# Patient Record
Sex: FEMALE | Race: WHITE | Employment: FULL TIME | URBAN - METROPOLITAN AREA
[De-identification: names, ages, dates, MRNs, and addresses within clinical notes are randomized per-mention and may not be internally consistent; named-entity substitution may affect disease eponyms.]

---

## 2020-07-30 ENCOUNTER — OP HISTORICAL/CONVERTED ENCOUNTER (OUTPATIENT)
Dept: OTHER | Age: 55
End: 2020-07-30

## 2020-09-03 ENCOUNTER — NURSE TRIAGE (OUTPATIENT)
Dept: OBGYN CLINIC | Age: 55
End: 2020-09-03

## 2020-09-08 DIAGNOSIS — R10.9 ABDOMINAL PAIN, UNSPECIFIED ABDOMINAL LOCATION: ICD-10-CM

## 2020-09-08 DIAGNOSIS — N83.201 RIGHT OVARIAN CYST: Primary | ICD-10-CM

## 2020-09-08 NOTE — PROGRESS NOTES
Spoke with patient and advised her per Dr Uyen Mason he would like her to have blood work drawn prior to her appt. Order entered.

## 2020-09-10 LAB — CANCER AG125 SERPL-ACNC: 9.2 U/ML (ref 0–38.1)

## 2020-09-12 VITALS — HEIGHT: 66 IN

## 2020-09-12 PROBLEM — E66.01 MORBID OBESITY (HCC): Status: ACTIVE | Noted: 2020-09-12

## 2020-09-12 PROBLEM — Z78.0 MENOPAUSE: Status: ACTIVE | Noted: 2020-09-12

## 2020-09-12 PROBLEM — N28.9 KIDNEY DISEASE: Status: ACTIVE | Noted: 2020-09-12

## 2020-09-15 ENCOUNTER — OFFICE VISIT (OUTPATIENT)
Dept: OBGYN CLINIC | Age: 55
End: 2020-09-15
Payer: COMMERCIAL

## 2020-09-15 VITALS — WEIGHT: 255.5 LBS | BODY MASS INDEX: 41.06 KG/M2 | HEIGHT: 66 IN

## 2020-09-15 DIAGNOSIS — R10.31 CHRONIC RLQ PAIN: Primary | ICD-10-CM

## 2020-09-15 DIAGNOSIS — G89.29 CHRONIC RLQ PAIN: Primary | ICD-10-CM

## 2020-09-15 DIAGNOSIS — N94.89 ADNEXAL MASS: ICD-10-CM

## 2020-09-15 PROBLEM — Z86.19 HX OF ROCKY MOUNTAIN SPOTTED FEVER: Status: ACTIVE | Noted: 2020-09-15

## 2020-09-15 PROCEDURE — 99214 OFFICE O/P EST MOD 30 MIN: CPT | Performed by: OBSTETRICS & GYNECOLOGY

## 2020-09-15 RX ORDER — LISINOPRIL 20 MG/1
20 TABLET ORAL DAILY
COMMUNITY
Start: 2019-03-12

## 2020-09-15 RX ORDER — DICYCLOMINE HYDROCHLORIDE 20 MG/1
TABLET ORAL
COMMUNITY
Start: 2020-07-17 | End: 2021-01-29 | Stop reason: ALTCHOICE

## 2020-09-15 RX ORDER — CLARITHROMYCIN 500 MG/1
500 TABLET, FILM COATED ORAL 2 TIMES DAILY
COMMUNITY
Start: 2020-08-03 | End: 2020-11-16 | Stop reason: ALTCHOICE

## 2020-09-15 RX ORDER — TRAMADOL HYDROCHLORIDE 50 MG/1
TABLET ORAL
COMMUNITY
Start: 2020-08-06

## 2020-09-15 RX ORDER — ONDANSETRON 4 MG/1
4 TABLET, FILM COATED ORAL
COMMUNITY
Start: 2020-07-15 | End: 2021-01-29 | Stop reason: ALTCHOICE

## 2020-09-15 RX ORDER — OMEPRAZOLE 20 MG/1
CAPSULE, DELAYED RELEASE ORAL
COMMUNITY
Start: 2020-08-03 | End: 2021-04-06 | Stop reason: ALTCHOICE

## 2020-09-15 RX ORDER — METHOCARBAMOL 500 MG/1
500 TABLET, FILM COATED ORAL
COMMUNITY
Start: 2020-07-13 | End: 2021-01-29 | Stop reason: ALTCHOICE

## 2020-09-15 NOTE — PROGRESS NOTES
Julian Alejandra is a , 60 y.o. female   No LMP recorded. Patient has had a hysterectomy. She presents for her problem    She is having RLQ dull constant pain in the right lower quadrant x 3 mths, no assoc. radiates to the flank lower back, no  or GI sxs- has had a CT scan, Colonoscopy and TV US, denies any F/C. Menstrual status:  Her periods are: hysterectomy. Cycles are: Hysterectomy. She does not have dysmenorrhea. Medical conditions:  Since her last annual GYN exam about two years ago, she has not the following changes in her health history: none. Past Medical History:   Diagnosis Date    Kidney disease 2020    Menopause 2020    Morbid obesity ([address] 2020     History reviewed. No pertinent surgical history. Prior to Admission medications    Medication Sig Start Date End Date Taking? Authorizing Provider   traMADoL (ULTRAM) 50 mg tablet TAKE 1 2 TABLETS BY MOUTH EVERY 6 HOURS AS NEEDED FOR PAIN 20  Yes Provider, Historical   ondansetron hcl (ZOFRAN) 4 mg tablet Take 4 mg by mouth every eight (8) hours as needed. 7/15/20  Yes Provider, Historical   omeprazole (PRILOSEC) 20 mg capsule TAKE 1 CAPSULE BY MOUTH TWICE DAILY FOR 14 DAYS THEN DAILY. 8/3/20  Yes Provider, Historical   methocarbamoL (ROBAXIN) 500 mg tablet Take 500 mg by mouth. 20  Yes Provider, Historical   lisinopriL (PRINIVIL, ZESTRIL) 20 mg tablet Take 20 mg by mouth daily. 3/12/19  Yes Provider, Historical   dicyclomine (BENTYL) 20 mg tablet TAKE 1 TABLET BY MOUTH 4 TIMES DAILY 20  Yes Provider, Historical   clarithromycin (BIAXIN) 500 mg tablet Take 500 mg by mouth two (2) times a day. 8/3/20  Yes Provider, Historical       Allergies   Allergen Reactions    Percocet [Oxycodone-Acetaminophen] Other (comments)     pruritis          Tobacco History:  reports that she has never smoked. She has never used smokeless tobacco.  Alcohol Abuse:  reports previous alcohol use.   Drug Abuse: reports no history of drug use. Family Medical/Cancer History: History reviewed. No pertinent family history. Review of Systems   Constitutional: Negative for chills, fever, malaise/fatigue and weight loss. HENT: Negative for congestion, ear pain, sinus pain and tinnitus. Eyes: Negative for blurred vision and double vision. Respiratory: Negative for cough, shortness of breath and wheezing. Cardiovascular: Negative for chest pain and palpitations. Gastrointestinal: Negative for abdominal pain, blood in stool, constipation, diarrhea, heartburn, nausea and vomiting. Right lower quadrant pain   Genitourinary: Negative for dysuria, flank pain, frequency, hematuria and urgency. Musculoskeletal: Negative for joint pain and myalgias. Skin: Negative for itching and rash. Neurological: Negative for dizziness, weakness and headaches. Psychiatric/Behavioral: Negative for depression, memory loss and suicidal ideas. The patient is not nervous/anxious and does not have insomnia. Physical Exam  Constitutional:       Appearance: Normal appearance. HENT:      Head: Normocephalic and atraumatic. Cardiovascular:      Rate and Rhythm: Normal rate. Heart sounds: Normal heart sounds. Pulmonary:      Effort: Pulmonary effort is normal.      Breath sounds: Normal breath sounds. Abdominal:      General: Abdomen is flat. Palpations: Abdomen is soft. Neurological:      Mental Status: She is alert. Psychiatric:         Mood and Affect: Mood normal.         Behavior: Behavior normal.         Thought Content: Thought content normal.          Visit Vitals  Ht 5' 6\" (1.676 m)   Wt 255 lb 8 oz (115.9 kg)   BMI 41.24 kg/m²         Assessment:  Diagnoses and all orders for this visit:    1. Chronic RLQ pain    2. Adnexal mass    Reviewed reports with pt, informed of normal  results, Anatomy explained to the pt. Plan:Questions addressed, Proceed with Diag. Laparoscopy  Counseled re: diet, exercise, healthy lifestyle  Return for Annual  Rec annual mammogram

## 2020-09-23 ENCOUNTER — TELEPHONE (OUTPATIENT)
Dept: OBGYN CLINIC | Age: 55
End: 2020-09-23

## 2020-09-23 NOTE — TELEPHONE ENCOUNTER
Returned pt phone call regarding surgery. Pt made me aware she had been seen two weeks ago with  and wanted to know surgery date and also wanted to know if MD had coordinated surgery with a General Surgeon for her appendectomy. I made pt aware I would notify Dr. Linda Radford to see if he had a surgery date. Pt stated \"If my appendix ruptures I am going to blame you all. \"

## 2020-09-28 ENCOUNTER — HOSPITAL ENCOUNTER (OUTPATIENT)
Dept: PREADMISSION TESTING | Age: 55
Discharge: HOME | End: 2020-09-28
Payer: COMMERCIAL

## 2020-09-28 VITALS
WEIGHT: 255.29 LBS | HEIGHT: 67 IN | BODY MASS INDEX: 40.07 KG/M2 | HEART RATE: 78 BPM | DIASTOLIC BLOOD PRESSURE: 86 MMHG | RESPIRATION RATE: 16 BRPM | TEMPERATURE: 98.5 F | SYSTOLIC BLOOD PRESSURE: 135 MMHG

## 2020-09-28 LAB
ABO + RH BLD: NORMAL
ALBUMIN SERPL-MCNC: 3.4 G/DL (ref 3.5–5)
ALBUMIN/GLOB SERPL: 0.8 {RATIO} (ref 1.1–2.2)
ALP SERPL-CCNC: 58 U/L (ref 45–117)
ALT SERPL-CCNC: 25 U/L (ref 12–78)
ANION GAP SERPL CALC-SCNC: 3 MMOL/L (ref 5–15)
AST SERPL W P-5'-P-CCNC: 16 U/L (ref 15–37)
BILIRUB SERPL-MCNC: 0.3 MG/DL (ref 0.2–1)
BLOOD BANK CMNT PATIENT-IMP: NORMAL
BLOOD GROUP ANTIBODIES SERPL: NEGATIVE
BUN SERPL-MCNC: 10 MG/DL (ref 6–20)
BUN/CREAT SERPL: 12 (ref 12–20)
CA-I BLD-MCNC: 8.9 MG/DL (ref 8.5–10.1)
CHLORIDE SERPL-SCNC: 107 MMOL/L (ref 97–108)
CO2 SERPL-SCNC: 32 MMOL/L (ref 21–32)
CREAT SERPL-MCNC: 0.82 MG/DL (ref 0.55–1.02)
ERYTHROCYTE [DISTWIDTH] IN BLOOD BY AUTOMATED COUNT: 13.5 % (ref 11.5–14.5)
GLOBULIN SER CALC-MCNC: 4.3 G/DL (ref 2–4)
GLUCOSE SERPL-MCNC: 86 MG/DL (ref 65–100)
HCT VFR BLD AUTO: 41.6 % (ref 35–47)
HGB BLD-MCNC: 13.7 % (ref 11.5–16)
MCH RBC QN AUTO: 29.6 PG (ref 26–34)
MCHC RBC AUTO-ENTMCNC: 32.9 G/DL (ref 30–36.5)
MCV RBC AUTO: 89.8 FL (ref 80–99)
PLATELET # BLD AUTO: 396 K/UL (ref 150–400)
PMV BLD AUTO: 9.3 FL (ref 8.9–12.9)
POTASSIUM SERPL-SCNC: 3.8 MMOL/L (ref 3.5–5.1)
PROT SERPL-MCNC: 7.7 G/DL (ref 6.4–8.2)
RBC # BLD AUTO: 4.63 M/UL (ref 3.8–5.2)
SARS-COV-2, COV2: NORMAL
SODIUM SERPL-SCNC: 142 MMOL/L (ref 136–145)
SPECIMEN EXP DATE BLD: NORMAL
WBC # BLD AUTO: 7.5 K/UL (ref 3.6–11)

## 2020-09-28 PROCEDURE — 87635 SARS-COV-2 COVID-19 AMP PRB: CPT

## 2020-09-28 PROCEDURE — 85027 COMPLETE CBC AUTOMATED: CPT

## 2020-09-28 PROCEDURE — 80053 COMPREHEN METABOLIC PANEL: CPT

## 2020-09-28 PROCEDURE — 86900 BLOOD TYPING SEROLOGIC ABO: CPT

## 2020-09-28 NOTE — PERIOP NOTES
1140 Dr. Jeramy Valdivia' office called, left a message for Trisha Suarez LPN re: pt stated during PAT visit that she was also having her appendix removed with Dr. Jeramy Valdivia' surgery scheduled on 10/2/2020, requested for Maggie to verify with Dr. Jeramy Valdivia and send new orders as needed. Abby Kaur 12 called from Dr. Jeramy Valdivia' office , stated she made Dr. Jeramy Valdivia aware and is not sure if that additional surgery to occur as yet with surgery planned on 10/2/2020 and Dr Jeramy Valdivia will call patient to discuss.

## 2020-10-01 LAB — SARS-COV-2, COV2NT: NOT DETECTED

## 2020-10-02 ENCOUNTER — HOSPITAL ENCOUNTER (OUTPATIENT)
Age: 55
Discharge: HOME | End: 2020-10-02
Attending: OBSTETRICS & GYNECOLOGY | Admitting: OBSTETRICS & GYNECOLOGY
Payer: COMMERCIAL

## 2020-10-02 ENCOUNTER — ANESTHESIA EVENT (OUTPATIENT)
Dept: SURGERY | Age: 55
End: 2020-10-02
Payer: COMMERCIAL

## 2020-10-02 ENCOUNTER — ANESTHESIA (OUTPATIENT)
Dept: SURGERY | Age: 55
End: 2020-10-02
Payer: COMMERCIAL

## 2020-10-02 ENCOUNTER — DOCUMENTATION ONLY (OUTPATIENT)
Dept: SURGERY | Age: 55
End: 2020-10-02

## 2020-10-02 VITALS
HEIGHT: 66 IN | OXYGEN SATURATION: 97 % | HEART RATE: 73 BPM | WEIGHT: 255 LBS | BODY MASS INDEX: 40.98 KG/M2 | RESPIRATION RATE: 18 BRPM | TEMPERATURE: 98.2 F | DIASTOLIC BLOOD PRESSURE: 88 MMHG | SYSTOLIC BLOOD PRESSURE: 141 MMHG

## 2020-10-02 DIAGNOSIS — G89.18 ACUTE POST-OPERATIVE PAIN: Primary | ICD-10-CM

## 2020-10-02 DIAGNOSIS — R10.31 RIGHT LOWER QUADRANT ABDOMINAL PAIN: ICD-10-CM

## 2020-10-02 PROBLEM — K38.9 APPENDIX DISEASE: Status: ACTIVE | Noted: 2020-10-02

## 2020-10-02 PROBLEM — N73.6 PELVIC ADHESIVE DISEASE: Status: ACTIVE | Noted: 2020-10-02

## 2020-10-02 PROBLEM — N70.11 HYDROSALPINX: Status: ACTIVE | Noted: 2020-10-02

## 2020-10-02 LAB
HBV SURFACE AG SER QL: <0.1 INDEX
HBV SURFACE AG SER QL: NEGATIVE
HCT VFR BLD AUTO: 42.6 % (ref 35–47)
HGB BLD-MCNC: 14.1 G/DL (ref 11.5–16)
HIV 1+2 AB+HIV1 P24 AG SERPL QL IA: NONREACTIVE
HIV12 RESULT COMMENT, HHIVC: NORMAL

## 2020-10-02 PROCEDURE — 87340 HEPATITIS B SURFACE AG IA: CPT

## 2020-10-02 PROCEDURE — 77030027491 HC SHR ENDOSC COVD -B: Performed by: OBSTETRICS & GYNECOLOGY

## 2020-10-02 PROCEDURE — 87389 HIV-1 AG W/HIV-1&-2 AB AG IA: CPT

## 2020-10-02 PROCEDURE — 77030022474 HC RELD STPLR ENDO GIA COVD -C: Performed by: OBSTETRICS & GYNECOLOGY

## 2020-10-02 PROCEDURE — 77030030537 HC STPLR ENDO GIA COVD -C: Performed by: OBSTETRICS & GYNECOLOGY

## 2020-10-02 PROCEDURE — 74011000272 HC RX REV CODE- 272: Performed by: OBSTETRICS & GYNECOLOGY

## 2020-10-02 PROCEDURE — 88305 TISSUE EXAM BY PATHOLOGIST: CPT

## 2020-10-02 PROCEDURE — 74011000258 HC RX REV CODE- 258: Performed by: NURSE ANESTHETIST, CERTIFIED REGISTERED

## 2020-10-02 PROCEDURE — 77030009851 HC PCH RTVR ENDOSC AMR -B: Performed by: OBSTETRICS & GYNECOLOGY

## 2020-10-02 PROCEDURE — 86803 HEPATITIS C AB TEST: CPT

## 2020-10-02 PROCEDURE — 76210000006 HC OR PH I REC 0.5 TO 1 HR: Performed by: OBSTETRICS & GYNECOLOGY

## 2020-10-02 PROCEDURE — 85018 HEMOGLOBIN: CPT

## 2020-10-02 PROCEDURE — 88304 TISSUE EXAM BY PATHOLOGIST: CPT

## 2020-10-02 PROCEDURE — 74011250636 HC RX REV CODE- 250/636: Performed by: OBSTETRICS & GYNECOLOGY

## 2020-10-02 PROCEDURE — 74011250637 HC RX REV CODE- 250/637: Performed by: OBSTETRICS & GYNECOLOGY

## 2020-10-02 PROCEDURE — 76060000033 HC ANESTHESIA 1 TO 1.5 HR: Performed by: OBSTETRICS & GYNECOLOGY

## 2020-10-02 PROCEDURE — 76210000022 HC REC RM PH II 1.5 TO 2 HR: Performed by: OBSTETRICS & GYNECOLOGY

## 2020-10-02 PROCEDURE — 77030022703 HC LIGASURE  BLNT LAPSCP SEAL COVD -E: Performed by: OBSTETRICS & GYNECOLOGY

## 2020-10-02 PROCEDURE — 2709999900 HC NON-CHARGEABLE SUPPLY: Performed by: OBSTETRICS & GYNECOLOGY

## 2020-10-02 PROCEDURE — 77030008518 HC TBNG INSUF ENDO STRY -B: Performed by: OBSTETRICS & GYNECOLOGY

## 2020-10-02 PROCEDURE — 36415 COLL VENOUS BLD VENIPUNCTURE: CPT

## 2020-10-02 PROCEDURE — 76010000149 HC OR TIME 1 TO 1.5 HR: Performed by: OBSTETRICS & GYNECOLOGY

## 2020-10-02 PROCEDURE — 74011250636 HC RX REV CODE- 250/636: Performed by: NURSE ANESTHETIST, CERTIFIED REGISTERED

## 2020-10-02 PROCEDURE — 44970 LAPAROSCOPY APPENDECTOMY: CPT | Performed by: SURGERY

## 2020-10-02 PROCEDURE — 74011250636 HC RX REV CODE- 250/636: Performed by: ANESTHESIOLOGY

## 2020-10-02 PROCEDURE — 77030008606 HC TRCR ENDOSC KII AMR -B: Performed by: OBSTETRICS & GYNECOLOGY

## 2020-10-02 PROCEDURE — 77030012799 HC TRCR GELPRT BLN AMR -B: Performed by: OBSTETRICS & GYNECOLOGY

## 2020-10-02 PROCEDURE — 74011250637 HC RX REV CODE- 250/637: Performed by: NURSE ANESTHETIST, CERTIFIED REGISTERED

## 2020-10-02 PROCEDURE — 74011000250 HC RX REV CODE- 250: Performed by: NURSE ANESTHETIST, CERTIFIED REGISTERED

## 2020-10-02 PROCEDURE — 77030018684: Performed by: OBSTETRICS & GYNECOLOGY

## 2020-10-02 RX ORDER — MIDAZOLAM HYDROCHLORIDE 1 MG/ML
INJECTION, SOLUTION INTRAMUSCULAR; INTRAVENOUS AS NEEDED
Status: DISCONTINUED | OUTPATIENT
Start: 2020-10-02 | End: 2020-10-02 | Stop reason: HOSPADM

## 2020-10-02 RX ORDER — DIPHENHYDRAMINE HYDROCHLORIDE 50 MG/ML
12.5 INJECTION, SOLUTION INTRAMUSCULAR; INTRAVENOUS
Status: DISCONTINUED | OUTPATIENT
Start: 2020-10-02 | End: 2020-10-06 | Stop reason: HOSPADM

## 2020-10-02 RX ORDER — SODIUM CHLORIDE, SODIUM LACTATE, POTASSIUM CHLORIDE, CALCIUM CHLORIDE 600; 310; 30; 20 MG/100ML; MG/100ML; MG/100ML; MG/100ML
INJECTION, SOLUTION INTRAVENOUS
Status: DISCONTINUED | OUTPATIENT
Start: 2020-10-02 | End: 2020-10-02 | Stop reason: HOSPADM

## 2020-10-02 RX ORDER — OXYCODONE HYDROCHLORIDE 5 MG/1
5-10 TABLET ORAL
Status: DISCONTINUED | OUTPATIENT
Start: 2020-10-02 | End: 2020-10-06 | Stop reason: HOSPADM

## 2020-10-02 RX ORDER — LIDOCAINE HYDROCHLORIDE 20 MG/ML
INJECTION, SOLUTION EPIDURAL; INFILTRATION; INTRACAUDAL; PERINEURAL AS NEEDED
Status: DISCONTINUED | OUTPATIENT
Start: 2020-10-02 | End: 2020-10-02 | Stop reason: HOSPADM

## 2020-10-02 RX ORDER — HYDROMORPHONE HYDROCHLORIDE 2 MG/1
2 TABLET ORAL
Qty: 18 TAB | Refills: 0 | Status: SHIPPED | OUTPATIENT
Start: 2020-10-02 | End: 2020-10-05

## 2020-10-02 RX ORDER — SODIUM CHLORIDE 0.9 G/100ML
IRRIGANT IRRIGATION AS NEEDED
Status: DISCONTINUED | OUTPATIENT
Start: 2020-10-02 | End: 2020-10-02 | Stop reason: HOSPADM

## 2020-10-02 RX ORDER — DOCUSATE SODIUM 100 MG/1
100 CAPSULE, LIQUID FILLED ORAL 2 TIMES DAILY
Status: DISCONTINUED | OUTPATIENT
Start: 2020-10-02 | End: 2020-10-06 | Stop reason: HOSPADM

## 2020-10-02 RX ORDER — PROPOFOL 10 MG/ML
INJECTION, EMULSION INTRAVENOUS AS NEEDED
Status: DISCONTINUED | OUTPATIENT
Start: 2020-10-02 | End: 2020-10-02 | Stop reason: HOSPADM

## 2020-10-02 RX ORDER — FENTANYL CITRATE 50 UG/ML
INJECTION, SOLUTION INTRAMUSCULAR; INTRAVENOUS AS NEEDED
Status: DISCONTINUED | OUTPATIENT
Start: 2020-10-02 | End: 2020-10-02 | Stop reason: HOSPADM

## 2020-10-02 RX ORDER — SCOLOPAMINE TRANSDERMAL SYSTEM 1 MG/1
1 PATCH, EXTENDED RELEASE TRANSDERMAL ONCE
Status: COMPLETED | OUTPATIENT
Start: 2020-10-02 | End: 2020-10-02

## 2020-10-02 RX ORDER — ROCURONIUM BROMIDE 10 MG/ML
INJECTION, SOLUTION INTRAVENOUS AS NEEDED
Status: DISCONTINUED | OUTPATIENT
Start: 2020-10-02 | End: 2020-10-02 | Stop reason: HOSPADM

## 2020-10-02 RX ORDER — ONDANSETRON 2 MG/ML
4 INJECTION INTRAMUSCULAR; INTRAVENOUS AS NEEDED
Status: DISCONTINUED | OUTPATIENT
Start: 2020-10-02 | End: 2020-10-02 | Stop reason: HOSPADM

## 2020-10-02 RX ORDER — KETOROLAC TROMETHAMINE 30 MG/ML
15 INJECTION, SOLUTION INTRAMUSCULAR; INTRAVENOUS
Status: DISCONTINUED | OUTPATIENT
Start: 2020-10-02 | End: 2020-10-03 | Stop reason: HOSPADM

## 2020-10-02 RX ORDER — SODIUM CHLORIDE 0.9 % (FLUSH) 0.9 %
5-40 SYRINGE (ML) INJECTION AS NEEDED
Status: DISCONTINUED | OUTPATIENT
Start: 2020-10-02 | End: 2020-10-02 | Stop reason: HOSPADM

## 2020-10-02 RX ORDER — SUCCINYLCHOLINE CHLORIDE 20 MG/ML
INJECTION INTRAMUSCULAR; INTRAVENOUS AS NEEDED
Status: DISCONTINUED | OUTPATIENT
Start: 2020-10-02 | End: 2020-10-02 | Stop reason: HOSPADM

## 2020-10-02 RX ORDER — CEFAZOLIN SODIUM IN 0.9 % NACL 2 G/100 ML
2 PLASTIC BAG, INJECTION (ML) INTRAVENOUS ONCE
Status: COMPLETED | OUTPATIENT
Start: 2020-10-02 | End: 2020-10-02

## 2020-10-02 RX ORDER — METOCLOPRAMIDE HYDROCHLORIDE 5 MG/ML
INJECTION INTRAMUSCULAR; INTRAVENOUS AS NEEDED
Status: DISCONTINUED | OUTPATIENT
Start: 2020-10-02 | End: 2020-10-02 | Stop reason: HOSPADM

## 2020-10-02 RX ORDER — HYDROMORPHONE HYDROCHLORIDE 2 MG/1
2 TABLET ORAL
Status: DISCONTINUED | OUTPATIENT
Start: 2020-10-02 | End: 2020-10-02 | Stop reason: HOSPADM

## 2020-10-02 RX ORDER — DIPHENHYDRAMINE HYDROCHLORIDE 50 MG/ML
12.5 INJECTION, SOLUTION INTRAMUSCULAR; INTRAVENOUS AS NEEDED
Status: DISCONTINUED | OUTPATIENT
Start: 2020-10-02 | End: 2020-10-02 | Stop reason: HOSPADM

## 2020-10-02 RX ORDER — PROMETHAZINE HYDROCHLORIDE 12.5 MG/1
SUPPOSITORY RECTAL AS NEEDED
Status: DISCONTINUED | OUTPATIENT
Start: 2020-10-02 | End: 2020-10-02 | Stop reason: HOSPADM

## 2020-10-02 RX ORDER — HYDROMORPHONE HYDROCHLORIDE 1 MG/ML
1 INJECTION, SOLUTION INTRAMUSCULAR; INTRAVENOUS; SUBCUTANEOUS
Status: DISCONTINUED | OUTPATIENT
Start: 2020-10-02 | End: 2020-10-06 | Stop reason: HOSPADM

## 2020-10-02 RX ORDER — FENTANYL CITRATE 50 UG/ML
25 INJECTION, SOLUTION INTRAMUSCULAR; INTRAVENOUS
Status: COMPLETED | OUTPATIENT
Start: 2020-10-02 | End: 2020-10-02

## 2020-10-02 RX ORDER — SODIUM CHLORIDE, SODIUM LACTATE, POTASSIUM CHLORIDE, CALCIUM CHLORIDE 600; 310; 30; 20 MG/100ML; MG/100ML; MG/100ML; MG/100ML
75 INJECTION, SOLUTION INTRAVENOUS CONTINUOUS
Status: DISCONTINUED | OUTPATIENT
Start: 2020-10-02 | End: 2020-10-06 | Stop reason: HOSPADM

## 2020-10-02 RX ORDER — SODIUM CHLORIDE 0.9 % (FLUSH) 0.9 %
5-40 SYRINGE (ML) INJECTION AS NEEDED
Status: DISCONTINUED | OUTPATIENT
Start: 2020-10-02 | End: 2020-10-06 | Stop reason: HOSPADM

## 2020-10-02 RX ORDER — SODIUM CHLORIDE, SODIUM LACTATE, POTASSIUM CHLORIDE, CALCIUM CHLORIDE 600; 310; 30; 20 MG/100ML; MG/100ML; MG/100ML; MG/100ML
20 INJECTION, SOLUTION INTRAVENOUS CONTINUOUS
Status: DISCONTINUED | OUTPATIENT
Start: 2020-10-02 | End: 2020-10-02 | Stop reason: HOSPADM

## 2020-10-02 RX ORDER — HYDROMORPHONE HYDROCHLORIDE 1 MG/ML
0.5 INJECTION, SOLUTION INTRAMUSCULAR; INTRAVENOUS; SUBCUTANEOUS
Status: DISCONTINUED | OUTPATIENT
Start: 2020-10-02 | End: 2020-10-02 | Stop reason: HOSPADM

## 2020-10-02 RX ORDER — ONDANSETRON 2 MG/ML
INJECTION INTRAMUSCULAR; INTRAVENOUS AS NEEDED
Status: DISCONTINUED | OUTPATIENT
Start: 2020-10-02 | End: 2020-10-02 | Stop reason: HOSPADM

## 2020-10-02 RX ORDER — ONDANSETRON 2 MG/ML
4 INJECTION INTRAMUSCULAR; INTRAVENOUS
Status: DISCONTINUED | OUTPATIENT
Start: 2020-10-02 | End: 2020-10-06 | Stop reason: HOSPADM

## 2020-10-02 RX ORDER — DEXAMETHASONE SODIUM PHOSPHATE 4 MG/ML
INJECTION, SOLUTION INTRA-ARTICULAR; INTRALESIONAL; INTRAMUSCULAR; INTRAVENOUS; SOFT TISSUE AS NEEDED
Status: DISCONTINUED | OUTPATIENT
Start: 2020-10-02 | End: 2020-10-02 | Stop reason: HOSPADM

## 2020-10-02 RX ADMIN — PROPOFOL 50 MG: 10 INJECTION, EMULSION INTRAVENOUS at 08:51

## 2020-10-02 RX ADMIN — ONDANSETRON 4 MG: 2 INJECTION INTRAMUSCULAR; INTRAVENOUS at 09:50

## 2020-10-02 RX ADMIN — SODIUM CHLORIDE, POTASSIUM CHLORIDE, SODIUM LACTATE AND CALCIUM CHLORIDE 20 ML/HR: 600; 310; 30; 20 INJECTION, SOLUTION INTRAVENOUS at 06:43

## 2020-10-02 RX ADMIN — LIDOCAINE HYDROCHLORIDE 80 MG: 20 INJECTION, SOLUTION EPIDURAL; INFILTRATION; INTRACAUDAL; PERINEURAL at 08:14

## 2020-10-02 RX ADMIN — DEXMEDETOMIDINE HYDROCHLORIDE 5 MCG: 100 INJECTION, SOLUTION, CONCENTRATE INTRAVENOUS at 08:14

## 2020-10-02 RX ADMIN — CEFAZOLIN 2 G: 10 INJECTION, POWDER, FOR SOLUTION INTRAVENOUS; PARENTERAL at 08:19

## 2020-10-02 RX ADMIN — DEXMEDETOMIDINE HYDROCHLORIDE 10 MCG: 100 INJECTION, SOLUTION, CONCENTRATE INTRAVENOUS at 08:48

## 2020-10-02 RX ADMIN — FENTANYL CITRATE 50 MCG: 50 INJECTION, SOLUTION INTRAMUSCULAR; INTRAVENOUS at 08:30

## 2020-10-02 RX ADMIN — PHENYLEPHRINE HYDROCHLORIDE 100 MCG: 10 INJECTION INTRAVENOUS at 09:05

## 2020-10-02 RX ADMIN — MIDAZOLAM HYDROCHLORIDE 2 MG: 2 INJECTION, SOLUTION INTRAMUSCULAR; INTRAVENOUS at 08:12

## 2020-10-02 RX ADMIN — SUGAMMADEX 200 MG: 100 INJECTION, SOLUTION INTRAVENOUS at 09:16

## 2020-10-02 RX ADMIN — PROPOFOL 50 MG: 10 INJECTION, EMULSION INTRAVENOUS at 08:21

## 2020-10-02 RX ADMIN — FENTANYL CITRATE 25 MCG: 50 INJECTION INTRAMUSCULAR; INTRAVENOUS at 10:05

## 2020-10-02 RX ADMIN — SCOPALAMINE 1 PATCH: 1 PATCH, EXTENDED RELEASE TRANSDERMAL at 08:20

## 2020-10-02 RX ADMIN — DEXMEDETOMIDINE HYDROCHLORIDE 10 MCG: 100 INJECTION, SOLUTION, CONCENTRATE INTRAVENOUS at 08:21

## 2020-10-02 RX ADMIN — DEXAMETHASONE SODIUM PHOSPHATE 8 MG: 4 INJECTION, SOLUTION INTRA-ARTICULAR; INTRALESIONAL; INTRAMUSCULAR; INTRAVENOUS; SOFT TISSUE at 08:20

## 2020-10-02 RX ADMIN — PHENYLEPHRINE HYDROCHLORIDE 100 MCG: 10 INJECTION INTRAVENOUS at 09:01

## 2020-10-02 RX ADMIN — PROMETHAZINE HYDROCHLORIDE 12.5 MG: 12.5 SUPPOSITORY RECTAL at 09:07

## 2020-10-02 RX ADMIN — FENTANYL CITRATE 50 MCG: 50 INJECTION, SOLUTION INTRAMUSCULAR; INTRAVENOUS at 08:14

## 2020-10-02 RX ADMIN — SODIUM CHLORIDE, POTASSIUM CHLORIDE, SODIUM LACTATE AND CALCIUM CHLORIDE: 600; 310; 30; 20 INJECTION, SOLUTION INTRAVENOUS at 08:08

## 2020-10-02 RX ADMIN — FENTANYL CITRATE 25 MCG: 50 INJECTION INTRAMUSCULAR; INTRAVENOUS at 09:58

## 2020-10-02 RX ADMIN — DEXMEDETOMIDINE HYDROCHLORIDE 5 MCG: 100 INJECTION, SOLUTION, CONCENTRATE INTRAVENOUS at 08:18

## 2020-10-02 RX ADMIN — PHENYLEPHRINE HYDROCHLORIDE 100 MCG: 10 INJECTION INTRAVENOUS at 09:08

## 2020-10-02 RX ADMIN — SUCCINYLCHOLINE CHLORIDE 120 MG: 20 INJECTION, SOLUTION INTRAMUSCULAR; INTRAVENOUS at 08:15

## 2020-10-02 RX ADMIN — ROCURONIUM BROMIDE 30 MG: 10 SOLUTION INTRAVENOUS at 08:20

## 2020-10-02 RX ADMIN — ONDANSETRON 4 MG: 2 INJECTION INTRAMUSCULAR; INTRAVENOUS at 08:08

## 2020-10-02 RX ADMIN — ROCURONIUM BROMIDE 20 MG: 10 SOLUTION INTRAVENOUS at 08:39

## 2020-10-02 RX ADMIN — METOCLOPRAMIDE HYDROCHLORIDE 10 MG: 5 INJECTION INTRAMUSCULAR; INTRAVENOUS at 09:02

## 2020-10-02 RX ADMIN — PROPOFOL 150 MG: 10 INJECTION, EMULSION INTRAVENOUS at 08:14

## 2020-10-02 RX ADMIN — FENTANYL CITRATE 25 MCG: 50 INJECTION INTRAMUSCULAR; INTRAVENOUS at 10:16

## 2020-10-02 RX ADMIN — FENTANYL CITRATE 25 MCG: 50 INJECTION INTRAMUSCULAR; INTRAVENOUS at 09:53

## 2020-10-02 NOTE — PROGRESS NOTES
Operative Report    Pre-op Diagnosis:   Chronic right lower quadrant abdominal pain    Post-operative Diagnosis:  As above    Procedure:  Laparoscopic appendectomy    Surgeon:  Collette Plant. Villa Hutching, MD    Assistant:  Adi Soto MD    Anesthesia:  GETA    EBL:  Minimal    Specimen:  Appendix    Procedure in Detail:  I was consulted intraoperatively due to an abnormal appearing appendix while Dr. Kimi Mason was performing surgery for chronic right lower quadrant abdominal pain. Upon entering the operating room the patient was already under satisfactory general anesthesia with trochars already in place. The appendix was identified. The tip appeared somewhat dilated but there was no perforation or surrounding inflammatory exudate. The appendix was mobilized from its mesentery using a LigaSure device and divided at its base using an Endo ALIVIA 45 mm tan load stapler. The staple line appeared intact and there was no evidence of bleeding from either the staple line or the mesentery. Dr. Kimi Mason then extracted the appendix along with his specimen. Please refer to Dr. Kimi Mason dictation for any portions of the procedure prior to my entering the operating room and after appendiceal division.

## 2020-10-02 NOTE — OP NOTES
[address] [place] [address]  OPERATIVE REPORT    Name:  Shayla Coleman  MR#:  [id number]  :  1965  ACCOUNT #:  [de-identified]  DATE OF SERVICE:  10/02/2020    PREOPERATIVE DIAGNOSIS:  Right lower quadrant pain. POSTOPERATIVE DIAGNOSES:  1. Right lower quadrant pain. 2.  Right hydrosalpinx. 3.  Appendix disease. 4.  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 PROCEDURES PERFORMED:  1. Laparoscopic lysis of adhesions. 2.  Laparoscopic right salpingo-oophorectomy. 3.  Laparoscopic appendectomy. SURGEONS:  Leah Apple MD and Shannon Humphries MD.    ASSISTANT:  Rebecca. ANESTHESIA:  General.    COMPLICATIONS:  None. SPECIMENS REMOVED: right tube and ovary, appendix    PATHOLOGY:  1. Right tube and ovary. 2.  Appendix. IMPLANTS: none    ESTIMATED BLOOD LOSS:  Less than 50 mL. DRAINS:  Straight catheter, clear urine. SURGICAL FINDINGS:  A large right hydrosalpinx with a normal-appearing right ovary. This adnexa was adhered to the right pelvic sidewall. The appendix was stretched and adhered to the hydrosalpinx and right pelvic sidewall as well. Normal-appearing left ovary. Adhesions of the right hydrosalpinx to the sigmoid colon. Adhesions of the sigmoid colon to the left pelvic sidewall. PROCEDURE:  The patient was taken to the operating room after informed consent had been reviewed. She was placed on the operating room table in the supine position and administered general anesthesia. Her legs were placed in [address] [place] [address], and she was prepped and draped in normal sterile fashion. Her bladder was drained using a straight catheter. A sponge stick was then placed inside the vagina. Attention was then turned to the abdomen where an infraumbilical skin incision was made with a scalpel, taken down to the underlying layer of fascia. The fascia was grasped with Kocher clamps x2, tented up, and entered sharply. Peritoneum was identified, tented up, and entered sharply.   After direct vision into the abdomen was noted, a blunt trocar was placed, it was secured. The abdomen was then insufflated while monitoring pressures and flow. The patient was placed in Trendelenburg presentation. The above findings were noted. Two 5 mm trocars were placed under direct vision, one in the suprapubic midline and one in the right lateral quadrant. After these were done using the graspers and the LigaSure device, the sigmoid was freed up from the right hydrosalpinx and right pelvic sidewall as well as the appendix was freed up as well. After this was done using the LigaSure device coming across the infundibulopelvic ligament and along the right pelvic sidewall, the right tube and ovary were excised. Good hemostasis of the dissection line was noted. This tissue was then pushed and put in the pelvis. An intraoperative consult was called with Dr. Kranthi Smith given the appendix. He scrubbed in and will dictate the laparoscopic appendectomy part. After he removed the appendix, the appendiceal stump appeared hemostatic. I took back over and both specimens were placed in an Endobag and secured. After this was done, further evaluation of the pelvis was performed. The sigmoid adhesions to the left pelvic sidewall were taken down using the LigaSure device. The left ovary appeared normal.  All of the dissection areas were inspected and no damage to surrounding tissue or the colon was noted. At this point, all instruments were removed from the pelvis along with the laparoscope and the Endobag. The abdomen was then desufflated. The trocars were removed. The infraumbilical fascia was reapproximated using 2-0 Vicryl in interrupted fashion. The skin was closed using a 4-0 Monocryl in subcuticular fashion. Dermabond was placed. The sponge stick was removed from the vagina. Sponge, lap, and needle counts were correct x2.   The patient tolerated the procedure without complications and was taken to the recovery room in stable condition.       Jorje Thomson MD      [id number]  D:  10/02/2020 10:02  T:  10/02/2020 14:33  JOB #:  [id number]

## 2020-10-02 NOTE — BRIEF OP NOTE
Brief Postoperative Note    Patient: Brittanie Syed  YOB: 1965  MRN: [id number]    Date of Procedure: 10/2/2020     Pre-Op Diagnosis: Right lower quadrant pain [R10.31]      Post-Op Diagnosis: RLQ pain, appendix disease, Pelvic adhesive disease, right hydrosalpinx      Procedure(s):  Laparoscopic Right Salpingectomy\Oophorectomy  APPENDECTOMY LAPAROSCOPIC, Laparoscopic WADE    Surgeon(s):  MD Juan Hernandez MD    Surgical Assistant: Gallito Morris    Anesthesia: General     Estimated Blood Loss (mL): less than 50     Complications: None    Specimens:   ID Type Source Tests Collected by Time Destination   1 : appendix Preservative Appendix  Toshia Flannery MD 10/2/2020 0901 Pathology   2 : Right fallopian tube and right ovary Preservative Ovary  Toshia Flannery MD 10/2/2020 0909 Pathology      Drains:St Cath- clear urine    Findings:Right hydrosalpinx with normal ovary adhered to the sigmoid and right pelvic sidewall, appendix stretched and adhered to the hydrosalpinx and pelvic sidewall, normal left adnexa, sigmoid adhered to the left pelvic sidewall.     Electronically Signed by Chanell Grigsby MD on 10/2/2020 at 9:31 AM

## 2020-10-02 NOTE — PROGRESS NOTES
Pt and daughter verbalized understanding of discharge instructions. Patient ambulating with crutches, baseline for patient. Requested labwork returned, WNL. Attempted to contact

## 2020-10-02 NOTE — ANESTHESIA PREPROCEDURE EVALUATION
Relevant Problems   No relevant active problems       Anesthetic History     PONV          Review of Systems / Medical History  Patient summary reviewed, nursing notes reviewed and pertinent labs reviewed    Pulmonary                   Neuro/Psych              Cardiovascular    Hypertension                   GI/Hepatic/Renal     GERD           Endo/Other        Morbid obesity     Other Findings   Comments: Allergies  Percocet (Oxycodone-acetaminophen)  Ht: 5' 6\" (167.6 cm)  Weight: 115.7 kg (255 lb)  BMI: 41.16 kg/m²  CrCl:   97.9 mL/min    Procedure  Laparoscopic Right Salpingectomy\Oophorectomy - Right      Medical History  Menopause  Morbid obesity ([address]  Kidney disease  Hypertension  [facility] spotted fever  Kidney stones  Nausea & vomiting  GERD (gastroesophageal reflux disease)     Results for Yolie Cuba (MRN [id number]) as of 10/2/2020 07:39    9/28/2020 11:45  WBC: 7.5  RBC: 4.63  HGB: 13.7  HCT: 41.6  MCV: 89.8  MCH: 29.6  MCHC: 32.9  RDW: 13.5  PLATELET: 859  MPV: 9.3    Sodium: 142  Potassium: 3.8  Chloride: 107  CO2: 32  Anion gap: 3 (L)  Glucose: 86  BUN: 10  Creatinine: 0.82  BUN/Creatinine ratio: 12  Calcium: 8.9  GFR est non-AA: >60  GFR est AA: >60  Bilirubin, total: 0.3  Protein, total: 7.7  Albumin: 3.4 (L)  Globulin: 4.3 (H)  A-G Ratio: 0.8 (L)  ALT: 25  AST: 16  Alk.  phosphatase: 58             Physical Exam    Airway  Mallampati: II  TM Distance: 4 - 6 cm  Neck ROM: normal range of motion   Mouth opening: Normal     Cardiovascular    Rhythm: regular  Rate: normal         Dental  No notable dental hx       Pulmonary  Breath sounds clear to auscultation               Abdominal  GI exam deferred       Other Findings            Anesthetic Plan    ASA: 3  Anesthesia type: general          Induction: Intravenous  Anesthetic plan and risks discussed with: Patient

## 2020-10-02 NOTE — ANESTHESIA POSTPROCEDURE EVALUATION
Procedure(s):  Laparoscopic Right Salpingectomy\Oophorectomy  APPENDECTOMY LAPAROSCOPIC.     general    Anesthesia Post Evaluation      Multimodal analgesia: multimodal analgesia not used between 6 hours prior to anesthesia start to PACU discharge  Patient location during evaluation: PACU  Patient participation: complete - patient participated  Level of consciousness: awake and alert  Pain score: 1  Pain management: adequate  Airway patency: patent  Anesthetic complications: no  Cardiovascular status: acceptable and hemodynamically stable  Respiratory status: spontaneous ventilation, nonlabored ventilation and room air  Hydration status: acceptable  Post anesthesia nausea and vomiting:  none  Final Post Anesthesia Temperature Assessment:  Normothermia (36.0-37.5 degrees C)      INITIAL Post-op Vital signs:   Vitals Value Taken Time   /79 10/2/2020  9:45 AM   Temp 36.6 °C (97.8 °F) 10/2/2020  9:30 AM   Pulse 66 10/2/2020  9:45 AM   Resp 14 10/2/2020  9:45 AM   SpO2 100 % 10/2/2020  9:45 AM

## 2020-10-05 LAB
HCV AB SER IA-ACNC: 0.27 INDEX
HCV COMMENT,HCGAC: NORMAL

## 2020-11-16 ENCOUNTER — OFFICE VISIT (OUTPATIENT)
Dept: OBGYN CLINIC | Age: 55
End: 2020-11-16
Payer: COMMERCIAL

## 2020-11-16 VITALS — HEIGHT: 66 IN | BODY MASS INDEX: 40.98 KG/M2 | WEIGHT: 255 LBS

## 2020-11-16 DIAGNOSIS — Z78.0 MENOPAUSE: ICD-10-CM

## 2020-11-16 DIAGNOSIS — K38.9 APPENDIX DISEASE: ICD-10-CM

## 2020-11-16 DIAGNOSIS — N70.11 HYDROSALPINX: ICD-10-CM

## 2020-11-16 DIAGNOSIS — Z09 POSTOP CHECK: Primary | ICD-10-CM

## 2020-11-16 DIAGNOSIS — R10.31 RIGHT LOWER QUADRANT ABDOMINAL PAIN: ICD-10-CM

## 2020-11-16 PROCEDURE — 99214 OFFICE O/P EST MOD 30 MIN: CPT | Performed by: OBSTETRICS & GYNECOLOGY

## 2020-11-16 NOTE — PROGRESS NOTES
Cori Alejandra is a G4 60-17-51-75, 60 y.o. female   No LMP recorded. Patient has had a hysterectomy. She presents for her post-op    She is having No issues with incisions- RLQ pain has resolved, no  or GI issues, Not on any pain meds. Denies any F/C. Menstrual status:  Her periods are: Hysterectomy. Cycles are: Hysterectomy. She does not have dysmenorrhea. Medical conditions:  Since her last annual GYN exam about one year ago, she has not the following changes in her health history: none. Past Medical History:   Diagnosis Date    GERD (gastroesophageal reflux disease)     Hypertension     Kidney disease 09/12/2020    pt states hx kidney stone with stent placement and removal     Kidney stones     pt states hx of 1 kidney stone    Menopause 9/12/2020    Morbid obesity ([address] 9/12/2020    Nausea & vomiting     [facility] spotted fever     pt states with joint pain since June 2020, Dx Aug 2020     Past Surgical History:   Procedure Laterality Date    HX COLONOSCOPY      HX ENDOSCOPY      upper     HX GYN      10- RSO    HX HIP REPLACEMENT      bilateral left 2015 and right 2019    HX LAP CHOLECYSTECTOMY      HX TOTAL ABDOMINAL HYSTERECTOMY      VT CYSTOURETHROSCOPY      with ureteral stent placement and removal on right side        Prior to Admission medications    Medication Sig Start Date End Date Taking? Authorizing Provider   OTHER Zinc 1 tab oral daily   Yes Provider, Historical   elderberry fruit (ELDERBERRY PO) Take  by mouth. 1 tab oral daily   Yes Provider, Historical   traMADoL (ULTRAM) 50 mg tablet TAKE 1 2 TABLETS BY MOUTH EVERY 6 HOURS AS NEEDED FOR PAIN 8/6/20  Yes Provider, Historical   ondansetron hcl (ZOFRAN) 4 mg tablet Take 4 mg by mouth every eight (8) hours as needed. 7/15/20  Yes Provider, Historical   omeprazole (PRILOSEC) 20 mg capsule TAKE 1 CAPSULE BY MOUTH TWICE DAILY FOR 14 DAYS THEN DAILY.  8/3/20  Yes Provider, Historical   methocarbamoL (ROBAXIN) 500 mg tablet Take 500 mg by mouth. 7/13/20  Yes Provider, Historical   lisinopriL (PRINIVIL, ZESTRIL) 20 mg tablet Take 20 mg by mouth daily. 3/12/19  Yes Provider, Historical   dicyclomine (BENTYL) 20 mg tablet TAKE 1 TABLET BY MOUTH 4 TIMES DAILY 7/17/20  Yes Provider, Historical       Allergies   Allergen Reactions    Percocet [Oxycodone-Acetaminophen] Other (comments)     pruritis          Tobacco History:  reports that she has never smoked. She has never used smokeless tobacco.  Alcohol Abuse:  reports previous alcohol use. Drug Abuse:  reports no history of drug use. Family Medical/Cancer History:   Family History   Family history unknown: Yes          Review of Systems   Constitutional: Negative for chills, fever, malaise/fatigue and weight loss. HENT: Negative for congestion, ear pain, sinus pain and tinnitus. Eyes: Negative for blurred vision and double vision. Respiratory: Negative for cough, shortness of breath and wheezing. Cardiovascular: Negative for chest pain and palpitations. Gastrointestinal: Negative for abdominal pain, blood in stool, constipation, diarrhea, heartburn, nausea and vomiting. Genitourinary: Negative for dysuria, flank pain, frequency, hematuria and urgency. Musculoskeletal: Negative for joint pain and myalgias. Skin: Negative for itching and rash. Neurological: Negative for dizziness, weakness and headaches. Psychiatric/Behavioral: Negative for depression, memory loss and suicidal ideas. The patient is not nervous/anxious and does not have insomnia. Physical Exam  Constitutional:       Appearance: Normal appearance. HENT:      Head: Normocephalic and atraumatic. Cardiovascular:      Rate and Rhythm: Normal rate. Heart sounds: Normal heart sounds. Pulmonary:      Effort: Pulmonary effort is normal.      Breath sounds: Normal breath sounds. Abdominal:      General: Abdomen is flat. Palpations: Abdomen is soft. Genitourinary:     General: Normal vulva. Vagina: Normal.      Uterus: Absent. Adnexa: Right adnexa normal and left adnexa normal.      Rectum: Normal.   Neurological:      Mental Status: She is alert. Psychiatric:         Mood and Affect: Mood normal.         Behavior: Behavior normal.         Thought Content: Thought content normal.          Visit Vitals  Ht 5' 6\" (1.676 m)   Wt 255 lb (115.7 kg)   BMI 41.16 kg/m²         Assessment:  Diagnoses and all orders for this visit:    1. Postop check    2. Menopause    3. Hydrosalpinx    4. Appendix disease    5.  Right lower quadrant abdominal pain        Plan:Questions addressed  Counseled re: diet, exercise, healthy lifestyle  Return for Annual  Rec annual mammogram

## 2021-01-29 ENCOUNTER — OFFICE VISIT (OUTPATIENT)
Dept: ENDOCRINOLOGY | Age: 56
End: 2021-01-29
Payer: COMMERCIAL

## 2021-01-29 VITALS
TEMPERATURE: 98 F | WEIGHT: 250.8 LBS | HEART RATE: 95 BPM | DIASTOLIC BLOOD PRESSURE: 76 MMHG | SYSTOLIC BLOOD PRESSURE: 118 MMHG | BODY MASS INDEX: 39.36 KG/M2 | HEIGHT: 67 IN | OXYGEN SATURATION: 97 %

## 2021-01-29 DIAGNOSIS — E05.90 SUBCLINICAL HYPERTHYROIDISM: Primary | ICD-10-CM

## 2021-01-29 PROCEDURE — 99204 OFFICE O/P NEW MOD 45 MIN: CPT | Performed by: INTERNAL MEDICINE

## 2021-01-29 NOTE — LETTER
1/29/2021 Patient: Kaleb Alejandra YOB: 1965 Date of Visit: 1/29/2021 Kahlil Ayoub NP 
[address] [place] [address] Via Fax: [phone] Dear Kahlil Ayoub NP, Thank you for referring Ms. Eileen Jarrett to [address] [place] [address] for evaluation. My notes for this consultation are attached. If you have questions, please do not hesitate to call me. I look forward to following your patient along with you. Sincerely, Edmar Bowles MD

## 2021-01-29 NOTE — PROGRESS NOTES
History and Physical    Patient: Colin Bolden MRN: [id number]  SSN: [id number]    YOB: 1965  Age: 60 y.o. Sex: female      Subjective:      Colin Bolden is a 60 y.o. female with past medical history of hypertension, GERD, [place] spotted fever is sent to me by primary care provider Theodore Cast NP for hyperthyroidism. In the past year patient has been having a lot of health issues. She was having a lot of body pains, etc. She was diagnosed with [facility] spotted fever. However, she was also found to have abnormal thyroid labs repeatedly. She is sent here for further evaluation and management. Symptoms: Heat intolerance, hair thinning, weight loss of 26 pounds in past 1 year (however, patient thinks this is intentional from exercising and eating right), she has 2 bowel movements per day, sleep is sometimes good and sometimes not.   Prior history of thyroid problems: No  Recent steroid treatments: No  High dose Biotin supplemnts:  No  Recent URI:  No  Tenderness in neck:  No  Swelling in neck:  No  Family history of thyroid problems:  Patient does not know as she was adopted  Personal/family history of autoimmune diseases:  No  smoking:  No  Change in appearance of eyes/ redness/ eye irritation: No  Personal history of cardiac disease: No  Personal history of osteoporosis/fragility fractures: No    Past Medical History:   Diagnosis Date    GERD (gastroesophageal reflux disease)     Hypertension     Kidney disease 09/12/2020    pt states hx kidney stone with stent placement and removal     Kidney stones     pt states hx of 1 kidney stone    Menopause 9/12/2020    Morbid obesity ([address] 9/12/2020    Nausea & vomiting     [facility] spotted fever     pt states with joint pain since June 2020, Dx Aug 2020     Past Surgical History:   Procedure Laterality Date    HX COLONOSCOPY      HX ENDOSCOPY      upper     HX GYN      10- RSO    HX HIP REPLACEMENT bilateral left 2015 and right 2019    HX LAP CHOLECYSTECTOMY      HX TOTAL ABDOMINAL HYSTERECTOMY      [place] CYSTOURETHROSCOPY      with ureteral stent placement and removal on right side       Family History   Family history unknown: Yes     Social History     Tobacco Use    Smoking status: Never Smoker    Smokeless tobacco: Never Used   Substance Use Topics    Alcohol use: Not Currently      Prior to Admission medications    Medication Sig Start Date End Date Taking? Authorizing Provider   traMADoL (ULTRAM) 50 mg tablet TAKE 1 2 TABLETS BY MOUTH EVERY 6 HOURS AS NEEDED FOR PAIN 8/6/20  Yes Provider, Historical   omeprazole (PRILOSEC) 20 mg capsule TAKE 1 CAPSULE BY MOUTH TWICE DAILY FOR 14 DAYS THEN DAILY. 8/3/20  Yes Provider, Historical   lisinopriL (PRINIVIL, ZESTRIL) 20 mg tablet Take 20 mg by mouth daily. 3/12/19  Yes Provider, Historical        Allergies   Allergen Reactions    Percocet [Oxycodone-Acetaminophen] Other (comments)     pruritis       Review of Systems:  ROS    A comprehensive review of systems was preformed and it is negative except mentioned in HPI    Objective:     Vitals:    01/29/21 1044   BP: 118/76   Pulse: 95   Temp: 98 °F (36.7 °C)   TempSrc: Temporal   SpO2: 97%   Weight: 250 lb 12.8 oz (113.8 kg)   Height: 5' 7\" (1.702 m)        Physical Exam:    Physical Exam  Vitals signs and nursing note reviewed. Constitutional:       Appearance: She is obese. HENT:      Head: Normocephalic and atraumatic. Eyes:      Extraocular Movements: Extraocular movements intact. Pupils: Pupils are equal, round, and reactive to light. Neck:      Musculoskeletal: Neck supple. Cardiovascular:      Rate and Rhythm: Normal rate and regular rhythm. Pulmonary:      Effort: Pulmonary effort is normal.      Breath sounds: Normal breath sounds. Abdominal:      General: Bowel sounds are normal.      Palpations: Abdomen is soft. Musculoskeletal: Normal range of motion.          General: No swelling. Skin:     General: Skin is warm and dry. Neurological:      General: No focal deficit present. Mental Status: She is alert and oriented to person, place, and time. Psychiatric:         Mood and Affect: Mood normal.         Behavior: Behavior normal.          Labs and Imaging:    Last 3 Recorded Weights in this Encounter    01/29/21 1044   Weight: 250 lb 12.8 oz (113.8 kg)        No results found for: HBA1C, HGBE8, QMW7CJXK, LUN8WDVN, SQG4AFAH     Assessment:     Patient Active Problem List   Diagnosis Code    Menopause Z78.0    Morbid obesity ([facility] [address] E66.01    Kidney disease N28.9    Hx of [place] spotted fever Z86.19    Right lower quadrant abdominal pain R10.31    Hydrosalpinx N70.11    Pelvic adhesive disease N73.6    Appendix disease K38.9    Subclinical hyperthyroidism E05.90           Plan:     Subclinical hyperthyroidism  I reviewed labs and notes from the referring provider's office. 370000:  TSH suppressed at 0.371 (0.454. 5)  Total T4 normal at 7.4 (four-point 512)  T3 uptake normal at 28% (24-39%)  Free thyroxine index normal at 2.1 (1.24.9)    10-:  TSH suppressed at 0.401  Total T4 normal at 7.8  T3 uptake normal at 28%  Free thyroxine index normal at 2.2    Normal CBC and liver enzymes    12-:  TSH suppressed at 0.270  Total T4 normal at 8.5  T3 uptake normal at 26%  Free thyroxine index normal at 2.2    I had a detailed discussion with patient about what is hyperthyroidism, long-term issues with untreated hyperthyroidism and etiologies. Plan:  Check thyroid antibodies today. Based on the result I may order thyroid uptake scan. I will see her back in 3 weeks to discuss management. Essential hypertension:  Blood pressure well controlled on current medications.     Orders Placed This Encounter    THYROID STIMULATING IMMUNOGLOBULIN    TSH RECEPTOR AB    THYROID PEROXIDASE (TPO) AB        Signed By: Trevor Pearl MD     January 29, 2021 Return to clinic 3 weeks

## 2021-01-31 LAB
THYROPEROXIDASE AB SERPL-ACNC: <9 IU/ML (ref 0–34)
TSH RECEP AB SER-ACNC: <1.1 IU/L (ref 0–1.75)
TSI SER-ACNC: <0.1 IU/L (ref 0–0.55)

## 2021-02-08 DIAGNOSIS — E05.90 SUBCLINICAL HYPERTHYROIDISM: Primary | ICD-10-CM

## 2021-02-09 NOTE — PROGRESS NOTES
Received lab results. Need to do thyroid uptake scan. I am making orders. She will be called for scheduling. Please make sure to have this done atleast 2-3 days before next appointment.

## 2021-02-11 ENCOUNTER — TELEPHONE (OUTPATIENT)
Dept: ENDOCRINOLOGY | Age: 56
End: 2021-02-11

## 2021-02-11 NOTE — TELEPHONE ENCOUNTER
Patient notified    ----- Message from Sidney Arguelles MD sent at 2/8/2021 11:54 PM EST -----  Received lab results. Need to do thyroid uptake scan. I am making orders. She will be called for scheduling. Please make sure to have this done atleast 2-3 days before next appointment.

## 2021-02-11 NOTE — TELEPHONE ENCOUNTER
Patient was notified of results. She said that she do not think that she will be able to get that scan done a few days before her appt because of how the hospital is set up. She said that she have a week off in April.

## 2021-02-26 ENCOUNTER — OFFICE VISIT (OUTPATIENT)
Dept: ENDOCRINOLOGY | Age: 56
End: 2021-02-26
Payer: COMMERCIAL

## 2021-02-26 VITALS
TEMPERATURE: 98.6 F | SYSTOLIC BLOOD PRESSURE: 131 MMHG | HEART RATE: 109 BPM | BODY MASS INDEX: 40.23 KG/M2 | WEIGHT: 256.3 LBS | HEIGHT: 67 IN | DIASTOLIC BLOOD PRESSURE: 77 MMHG | OXYGEN SATURATION: 97 %

## 2021-02-26 DIAGNOSIS — E05.90 SUBCLINICAL HYPERTHYROIDISM: Primary | ICD-10-CM

## 2021-02-26 PROCEDURE — 99214 OFFICE O/P EST MOD 30 MIN: CPT | Performed by: INTERNAL MEDICINE

## 2021-02-26 NOTE — LETTER
2/26/2021 Patient: Josee Alejandra YOB: 1965 Date of Visit: 2/26/2021 Say Crowley NP 
[address] [place] [id number] [address] Fax: [phone] Dear Say Crowley NP, Thank you for referring Ms. Mahi Lanier to [address] [place] [address] for evaluation. My notes for this consultation are attached. If you have questions, please do not hesitate to call me. I look forward to following your patient along with you. Sincerely, Rahda Crawley MD

## 2021-02-26 NOTE — PROGRESS NOTES
History and Physical    Patient: Jose Crawford MRN: [id number]  SSN: [id number]    YOB: 1965  Age: 60 y.o. Sex: female      Subjective:      Jose Crawford is a 60 y.o. female with past medical history of hypertension, GERD, [place] spotted fever is here for follow-up of hyperthyroidism. She was initially sent to me by primary care provider Prashanth Espinal NP. In the past year patient has been having a lot of health issues. She was having a lot of body pains, etc. She was diagnosed with [facility] spotted fever. However, she was also found to have abnormal thyroid labs repeatedly. She is sent here for further evaluation and management. At the last visit we had a long discussion about pathophysiology of hyperthyroidism. Patient was very interested in knowing what is the cause of her hyperthyroidism. Thyroid antibodies were checked which came back negative. After this thyroid uptake scan was ordered, however patient had a lot of questions about this, so she did not get it done and she is here for a follow-up. Denies any change in her symptoms since the last visit. Symptoms: Heat intolerance, hair thinning, weight loss of 26 pounds in past 1 year (however, patient thinks this is intentional from exercising and eating right), she has 2 bowel movements per day, sleep is sometimes good and sometimes not.   Prior history of thyroid problems: No  Recent steroid treatments: No  High dose Biotin supplemnts:  No  Recent URI:  No  Tenderness in neck:  No  Swelling in neck:  No  Family history of thyroid problems:  Patient does not know as she was adopted  Personal/family history of autoimmune diseases:  No  smoking:  No, exposed to second hand smoking  Change in appearance of eyes/ redness/ eye irritation: No  Personal history of cardiac disease: No  Personal history of osteoporosis/fragility fractures: No    Past Medical History:   Diagnosis Date    GERD (gastroesophageal reflux disease)     Hypertension     Kidney disease 09/12/2020    pt states hx kidney stone with stent placement and removal     Kidney stones     pt states hx of 1 kidney stone    Menopause 9/12/2020    Morbid obesity ([address] 9/12/2020    Nausea & vomiting     [facility] spotted fever     pt states with joint pain since June 2020, Dx Aug 2020     Past Surgical History:   Procedure Laterality Date    HX COLONOSCOPY      HX ENDOSCOPY      upper     HX GYN      10- RSO    HX HIP REPLACEMENT      bilateral left 2015 and right 2019    HX LAP CHOLECYSTECTOMY      HX TOTAL ABDOMINAL HYSTERECTOMY      [facility] CYSTOURETHROSCOPY      with ureteral stent placement and removal on right side       Family History   Family history unknown: Yes     Social History     Tobacco Use    Smoking status: Never Smoker    Smokeless tobacco: Never Used   Substance Use Topics    Alcohol use: Not Currently      Prior to Admission medications    Medication Sig Start Date End Date Taking? Authorizing Provider   traMADoL (ULTRAM) 50 mg tablet TAKE 1 2 TABLETS BY MOUTH EVERY 6 HOURS AS NEEDED FOR PAIN 8/6/20  Yes Provider, Historical   omeprazole (PRILOSEC) 20 mg capsule TAKE 1 CAPSULE BY MOUTH TWICE DAILY FOR 14 DAYS THEN DAILY. 8/3/20  Yes Provider, Historical   lisinopriL (PRINIVIL, ZESTRIL) 20 mg tablet Take 20 mg by mouth daily. 3/12/19  Yes Provider, Historical        Allergies   Allergen Reactions    Percocet [Oxycodone-Acetaminophen] Other (comments)     pruritis       Review of Systems:  ROS    A comprehensive review of systems was preformed and it is negative except mentioned in HPI    Objective:     Vitals:    02/26/21 1508   BP: 131/77   Pulse: (!) 109   Temp: 98.6 °F (37 °C)   TempSrc: Temporal   SpO2: 97%   Weight: 256 lb 4.8 oz (116.3 kg)   Height: 5' 7\" (1.702 m)        Physical Exam:    Physical Exam  Vitals signs and nursing note reviewed. Constitutional:       Appearance: She is obese.    HENT:      Head: Normocephalic and atraumatic. Cardiovascular:      Rate and Rhythm: Normal rate and regular rhythm. Pulmonary:      Effort: Pulmonary effort is normal.      Breath sounds: Normal breath sounds. Neurological:      Mental Status: She is alert. Labs and Imagin2021    Thyroid peroxidase Ab <9   0 - 34 IU/mL Final     Thyroid Stim Immunoglobulin <0.10   0.00 - 0.55 IU/L Final     Thyrotropin Receptor Ab, serum <1.10   0.00 - 1.75 IU/L Final         Last 3 Recorded Weights in this Encounter    21 1508   Weight: 256 lb 4.8 oz (116.3 kg)        No results found for: HBA1C, HGBE8, JXC9HHFG, CPO7UMRZ, CGL1TYAI     Assessment:     Patient Active Problem List   Diagnosis Code    Menopause Z78.0    Morbid obesity ([facility] [address] E66.01    Kidney disease N28.9    Hx of [place] spotted fever Z86.19    Right lower quadrant abdominal pain R10.31    Hydrosalpinx N70.11    Pelvic adhesive disease N73.6    Appendix disease K38.9    Subclinical hyperthyroidism E05.90           Plan:     Subclinical hyperthyroidism  137503:  TSH suppressed at 0.371 (0.454. 5)  Total T4 normal at 7.4 (four-point 512)  T3 uptake normal at 28% (24-39%)  Free thyroxine index normal at 2.1 (1.24.9)    10-:  TSH suppressed at 0.401  Total T4 normal at 7.8  T3 uptake normal at 28%  Free thyroxine index normal at 2.2    Normal CBC and liver enzymes    2020:  TSH suppressed at 0.270  Total T4 normal at 8.5  T3 uptake normal at 26%  Free thyroxine index normal at 2.2    0855008:  TPO, TSI and TR AB tested negative. Thyroid uptake scan was ordered but patient has not done this yet. Plan:  I had a long discussion with patient again about causes of hyperthyroidism, symptoms, long-term effects of not being treated. Patient agrees to get thyroid uptake scan done, but she is not sure when she will do this.   Advised patient to call my office after she schedules her scan, so that we can discuss the result and further management. Essential hypertension:  Blood pressure well controlled on current medications. Time spent with patient: 35 minutes  No orders of the defined types were placed in this encounter.        Signed By: Bambi Mark MD     February 26, 2021      Return to clinic as needed

## 2021-03-22 ENCOUNTER — TELEPHONE (OUTPATIENT)
Dept: ENDOCRINOLOGY | Age: 56
End: 2021-03-22

## 2021-03-22 NOTE — TELEPHONE ENCOUNTER
Noted the information that patient gave me. That does not change our plan. She still needs to get thyroid uptake and scan.

## 2021-03-22 NOTE — TELEPHONE ENCOUNTER
Patient said that she will be getting medication on the 25th and then the procedure done on the 26th.

## 2021-03-22 NOTE — TELEPHONE ENCOUNTER
Patient is calling to inform that she went to the dentist on Thursday and he informed her that she has another thing that is possibly affecting her thyroid--lichen planus. She is scheduled for her thyroid US & uptake this week. She would like for you to call her about this.

## 2021-04-01 DIAGNOSIS — E05.90 SUBCLINICAL HYPERTHYROIDISM: ICD-10-CM

## 2021-04-01 PROCEDURE — 76536 US EXAM OF HEAD AND NECK: CPT | Performed by: INTERNAL MEDICINE

## 2021-04-06 ENCOUNTER — OFFICE VISIT (OUTPATIENT)
Dept: OBGYN CLINIC | Age: 56
End: 2021-04-06

## 2021-04-06 VITALS
BODY MASS INDEX: 44.14 KG/M2 | DIASTOLIC BLOOD PRESSURE: 74 MMHG | HEIGHT: 63 IN | SYSTOLIC BLOOD PRESSURE: 128 MMHG | TEMPERATURE: 97.9 F | WEIGHT: 249.13 LBS

## 2021-04-06 DIAGNOSIS — Z78.0 MENOPAUSE: ICD-10-CM

## 2021-04-06 DIAGNOSIS — Z12.31 VISIT FOR SCREENING MAMMOGRAM: Primary | ICD-10-CM

## 2021-04-06 DIAGNOSIS — Z12.72 SCREENING FOR MALIGNANT NEOPLASM OF VAGINA AFTER PARTIAL HYSTERECTOMY: Primary | ICD-10-CM

## 2021-04-06 DIAGNOSIS — Z01.419 ROUTINE GYNECOLOGICAL EXAMINATION: ICD-10-CM

## 2021-04-06 DIAGNOSIS — Z90.711 SCREENING FOR MALIGNANT NEOPLASM OF VAGINA AFTER PARTIAL HYSTERECTOMY: Primary | ICD-10-CM

## 2021-04-06 PROCEDURE — 99396 PREV VISIT EST AGE 40-64: CPT | Performed by: OBSTETRICS & GYNECOLOGY

## 2021-04-06 PROCEDURE — 77063 BREAST TOMOSYNTHESIS BI: CPT | Performed by: OBSTETRICS & GYNECOLOGY

## 2021-04-06 PROCEDURE — 77067 SCR MAMMO BI INCL CAD: CPT | Performed by: OBSTETRICS & GYNECOLOGY

## 2021-04-06 NOTE — PROGRESS NOTES
Mona Riedel Dugger is a G4 60-17-51-75, 60 y.o. female   No LMP recorded. Patient has had a hysterectomy. She presents for her annual    She is having no significant problems. Menstrual status:  Her periods are: menopause. Cycles are: menopause. She does not have dysmenorrhea. Medical conditions:  Since her last annual GYN exam about one year ago, she has not the following changes in her health history: none. Past Medical History:   Diagnosis Date    GERD (gastroesophageal reflux disease)     Hypertension     Kidney disease 09/12/2020    pt states hx kidney stone with stent placement and removal     Kidney stones     pt states hx of 1 kidney stone    Menopause 9/12/2020    Morbid obesity ([address] 9/12/2020    Nausea & vomiting     [facility] spotted fever     pt states with joint pain since June 2020, Dx Aug 2020     Past Surgical History:   Procedure Laterality Date    HX COLONOSCOPY      HX ENDOSCOPY      upper     HX GYN      10- RSO    HX HIP REPLACEMENT      bilateral left 2015 and right 2019    HX LAP CHOLECYSTECTOMY      HX TOTAL ABDOMINAL HYSTERECTOMY      [place] CYSTOURETHROSCOPY      with ureteral stent placement and removal on right side        Prior to Admission medications    Medication Sig Start Date End Date Taking? Authorizing Provider   traMADoL (ULTRAM) 50 mg tablet TAKE 1 2 TABLETS BY MOUTH EVERY 6 HOURS AS NEEDED FOR PAIN 8/6/20  Yes Provider, Historical   lisinopriL (PRINIVIL, ZESTRIL) 20 mg tablet Take 20 mg by mouth daily. 3/12/19  Yes Provider, Historical       Allergies   Allergen Reactions    Percocet [Oxycodone-Acetaminophen] Other (comments)     pruritis          Tobacco History:  reports that she has never smoked. She has never used smokeless tobacco.  Alcohol Abuse:  reports previous alcohol use. Drug Abuse:  reports no history of drug use.     Family Medical/Cancer History:   Family History   Family history unknown: Yes          Review of Systems Constitutional: Negative for chills, fever, malaise/fatigue and weight loss. HENT: Negative for congestion, ear pain, sinus pain and tinnitus. Eyes: Negative for blurred vision and double vision. Respiratory: Negative for cough, shortness of breath and wheezing. Cardiovascular: Negative for chest pain and palpitations. Gastrointestinal: Negative for abdominal pain, blood in stool, constipation, diarrhea, heartburn, nausea and vomiting. Genitourinary: Negative for dysuria, flank pain, frequency, hematuria and urgency. Musculoskeletal: Negative for joint pain and myalgias. Skin: Negative for itching and rash. Neurological: Negative for dizziness, weakness and headaches. Psychiatric/Behavioral: Negative for depression, memory loss and suicidal ideas. The patient is not nervous/anxious and does not have insomnia. Physical Exam  Constitutional:       Appearance: Normal appearance. HENT:      Head: Normocephalic and atraumatic. Cardiovascular:      Rate and Rhythm: Normal rate. Heart sounds: Normal heart sounds. Pulmonary:      Effort: Pulmonary effort is normal.      Breath sounds: Normal breath sounds. Chest:      Breasts:         Right: Normal.         Left: Normal.   Abdominal:      General: Abdomen is flat. Palpations: Abdomen is soft. Genitourinary:     General: Normal vulva. Vagina: Normal.      Uterus: Absent. Adnexa: Right adnexa normal and left adnexa normal.      Rectum: Normal.      Comments: PAP Obtained  Neurological:      Mental Status: She is alert. Psychiatric:         Mood and Affect: Mood normal.         Behavior: Behavior normal.         Thought Content:  Thought content normal.          Visit Vitals  /74 (BP 1 Location: Right arm, BP Patient Position: Sitting, BP Cuff Size: Large adult)   Temp 97.9 °F (36.6 °C) (Temporal)   Ht 5' 3.3\" (1.608 m)   Wt 249 lb 2 oz (113 kg)   BMI 43.71 kg/m²         Assessment:  Diagnoses and all orders for this visit:    1. Screening for malignant neoplasm of vagina after partial hysterectomy  -     PAP IG, RFX APTIMA HPV ASCUS ([id number])    2. Routine gynecological examination  -     PAP IG, RFX APTIMA HPV ASCUS ([id number])    3. BMI 40.0-44.9, adult ([facility] [address]    4. Menopause    Lost at least # 30     Plan:Questions addressed  Counseled re: diet, exercise, healthy lifestyle  Return for Annual  Rec annual mammogram        Follow-up and Dispositions    · Return for 1 yr annual, 1 yr mammo.

## 2021-04-08 ENCOUNTER — OFFICE VISIT (OUTPATIENT)
Dept: ENDOCRINOLOGY | Age: 56
End: 2021-04-08
Payer: COMMERCIAL

## 2021-04-08 VITALS
SYSTOLIC BLOOD PRESSURE: 124 MMHG | WEIGHT: 248.6 LBS | TEMPERATURE: 97.5 F | BODY MASS INDEX: 44.05 KG/M2 | OXYGEN SATURATION: 98 % | HEIGHT: 63 IN | DIASTOLIC BLOOD PRESSURE: 82 MMHG | HEART RATE: 88 BPM

## 2021-04-08 DIAGNOSIS — E05.90 SUBCLINICAL HYPERTHYROIDISM: Primary | ICD-10-CM

## 2021-04-08 LAB
CYTOLOGIST CVX/VAG CYTO: NORMAL
CYTOLOGY CVX/VAG DOC CYTO: NORMAL
CYTOLOGY CVX/VAG DOC THIN PREP: NORMAL
DX ICD CODE: NORMAL
LABCORP, 190119: NORMAL
Lab: NORMAL
OTHER STN SPEC: NORMAL
STAT OF ADQ CVX/VAG CYTO-IMP: NORMAL

## 2021-04-08 PROCEDURE — 99214 OFFICE O/P EST MOD 30 MIN: CPT | Performed by: INTERNAL MEDICINE

## 2021-04-08 NOTE — PROGRESS NOTES
History and Physical    Patient: Urvashi Dickson MRN: [id number]  SSN: [id number]    YOB: 1965  Age: 60 y.o. Sex: female      Subjective:      Urvashi Dickson is a 60 y.o. female with past medical history of hypertension, GERD, [place] spotted fever is here for follow-up of hyperthyroidism. She was initially sent to me by primary care provider Porsha Buenrostro NP. In the past year patient has been having a lot of health issues. She was having a lot of body pains, etc. She was diagnosed with [facility] spotted fever. However, she was also found to have abnormal thyroid labs repeatedly. She is sent here for further evaluation and management. At the last visit we had a long discussion about pathophysiology of hyperthyroidism. Patient was very interested in knowing what is the cause of her hyperthyroidism. Thyroid antibodies were checked which came back negative. After the last visit patient had thyroid uptake scan done and she is here to discuss the results. She has lost a few pounds since last visit, which she thinks is because she is eating healthy and exercising more. Overall she continues to feel well. Symptoms: Heat intolerance, hair thinning, weight loss of 26 pounds in past 1 year (however, patient thinks this is intentional from exercising and eating right), she has 2 bowel movements per day, sleep is sometimes good and sometimes not.   Prior history of thyroid problems: No  Recent steroid treatments: No  High dose Biotin supplemnts:  No  Recent URI:  No  Tenderness in neck:  No  Swelling in neck:  No  Family history of thyroid problems:  Patient does not know as she was adopted  Personal/family history of autoimmune diseases:  No  smoking:  No, exposed to second hand smoking  Change in appearance of eyes/ redness/ eye irritation: No  Personal history of cardiac disease: No  Personal history of osteoporosis/fragility fractures: No    Past Medical History: Diagnosis Date    GERD (gastroesophageal reflux disease)     Hypertension     Kidney disease 09/12/2020    pt states hx kidney stone with stent placement and removal     Kidney stones     pt states hx of 1 kidney stone    Menopause 9/12/2020    Morbid obesity ([address] 9/12/2020    Nausea & vomiting     [facility] spotted fever     pt states with joint pain since June 2020, Dx Aug 2020     Past Surgical History:   Procedure Laterality Date    HX COLONOSCOPY      HX ENDOSCOPY      upper     HX GYN      10- RSO    HX HIP REPLACEMENT      bilateral left 2015 and right 2019    HX LAP CHOLECYSTECTOMY      HX TOTAL ABDOMINAL HYSTERECTOMY      MT CYSTOURETHROSCOPY      with ureteral stent placement and removal on right side       Family History   Family history unknown: Yes     Social History     Tobacco Use    Smoking status: Never Smoker    Smokeless tobacco: Never Used   Substance Use Topics    Alcohol use: Not Currently      Prior to Admission medications    Medication Sig Start Date End Date Taking? Authorizing Provider   traMADoL (ULTRAM) 50 mg tablet TAKE 1 2 TABLETS BY MOUTH EVERY 6 HOURS AS NEEDED FOR PAIN 8/6/20  Yes Provider, Historical   lisinopriL (PRINIVIL, ZESTRIL) 20 mg tablet Take 20 mg by mouth daily. 3/12/19  Yes Provider, Historical        Allergies   Allergen Reactions    Percocet [Oxycodone-Acetaminophen] Other (comments)     pruritis       Review of Systems:  ROS    A comprehensive review of systems was preformed and it is negative except mentioned in HPI    Objective:     Vitals:    04/08/21 1501   BP: 124/82   Pulse: 88   Temp: 97.5 °F (36.4 °C)   TempSrc: Temporal   SpO2: 98%   Weight: 248 lb 9.6 oz (112.8 kg)   Height: 5' 3\" (1.6 m)        Physical Exam:    Physical Exam  Vitals signs and nursing note reviewed. Constitutional:       Appearance: She is obese. HENT:      Head: Normocephalic and atraumatic.    Cardiovascular:      Rate and Rhythm: Normal rate and regular rhythm. Pulmonary:      Effort: Pulmonary effort is normal.      Breath sounds: Normal breath sounds. Neurological:      Mental Status: She is alert. Labs and Imagin2021    Thyroid peroxidase Ab <9   0 - 34 IU/mL Final     Thyroid Stim Immunoglobulin <0.10   0.00 - 0.55 IU/L Final     Thyrotropin Receptor Ab, serum <1.10   0.00 - 1.75 IU/L Final     Thyroid US and uptake scan 3-:      Last 3 Recorded Weights in this Encounter    21 1501   Weight: 248 lb 9.6 oz (112.8 kg)        No results found for: HBA1C, HGBE8, GEL8JESP, HYZ8BEZT, EXR7VPZF     Assessment:     Patient Active Problem List   Diagnosis Code    Menopause Z78.0    Morbid obesity ([facility] [address] E66.01    Kidney disease N28.9    Hx of [place] spotted fever Z86.19    Right lower quadrant abdominal pain R10.31    Hydrosalpinx N70.11    Pelvic adhesive disease N73.6    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 5)  Total T4 normal at 7.4 (four-point 512)  T3 uptake normal at 28% (24-39%)  Free thyroxine index normal at 2.1 (1.24.9)    10-:  TSH suppressed at 0.401  Total T4 normal at 7.8  T3 uptake normal at 28%  Free thyroxine index normal at 2.2    Normal CBC and liver enzymes    2020:  TSH suppressed at 0.270  Total T4 normal at 8.5  T3 uptake normal at 26%  Free thyroxine index normal at 2.2    1171563:  TPO, TSI and TR AB tested negative. 3-:  Thyroid ultrasound showed enlarged heterogenous gland without any suspicious nodules  Thyroid uptake scan showed 24-hour uptake at the upper end of normal range at 23.98%, upper limit of normal range is 24%, however, given TSH was suppressed, this is considered to be increased uptake. Homogeneous distribution. Plan:  Discussed with patient about treatment with low-dose of methimazole versus observation.   Since patient is feeling well, she would like to do observation without medication at this time. I would like to see her back in 6 months, with labs prior to the visit. Symptoms to look out for would be palpitations, frequent bowel movements, heat intolerance, excessive sweating, insomnia, anxiety, irritability. Essential hypertension:  Blood pressure well controlled on current medications.     Orders Placed This Encounter    TSH AND FREE T4     Standing Status:   Future     Standing Expiration Date:   10/8/2021        Signed By: Parag Viveros MD     April 8, 2021      Return to clinic 6 months

## 2021-04-08 NOTE — LETTER
4/8/2021 Patient: Shahram Alejandra YOB: 1965 Date of Visit: 4/8/2021 Irvin Colin NP 
[address] [place] [address] Via Fax: [phone] Dear Irvin Colin NP, Thank you for referring Ms. Elise Bass to [address] [place] [address] for evaluation. My notes for this consultation are attached. If you have questions, please do not hesitate to call me. I look forward to following your patient along with you. Sincerely, Jordan Ronquillo MD

## 2021-06-06 ENCOUNTER — APPOINTMENT (OUTPATIENT)
Dept: GENERAL RADIOLOGY | Age: 56
End: 2021-06-06
Attending: EMERGENCY MEDICINE
Payer: COMMERCIAL

## 2021-06-06 ENCOUNTER — HOSPITAL ENCOUNTER (EMERGENCY)
Age: 56
Discharge: HOME | End: 2021-06-06
Attending: EMERGENCY MEDICINE
Payer: COMMERCIAL

## 2021-06-06 VITALS
HEART RATE: 93 BPM | DIASTOLIC BLOOD PRESSURE: 83 MMHG | BODY MASS INDEX: 39.53 KG/M2 | SYSTOLIC BLOOD PRESSURE: 130 MMHG | TEMPERATURE: 98.2 F | OXYGEN SATURATION: 98 % | HEIGHT: 66 IN | RESPIRATION RATE: 18 BRPM | WEIGHT: 246 LBS
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
## 2021-06-06 DIAGNOSIS — S16.1XXA STRAIN OF NECK MUSCLE, INITIAL ENCOUNTER: ICD-10-CM

## 2021-06-06 PROCEDURE — 99282 EMERGENCY DEPT VISIT SF MDM: CPT

## 2021-06-06 PROCEDURE — 72050 X-RAY EXAM NECK SPINE 4/5VWS: CPT

## 2021-06-06 PROCEDURE — 72072 X-RAY EXAM THORAC SPINE 3VWS: CPT

## 2021-06-06 PROCEDURE — 72100 X-RAY EXAM L-S SPINE 2/3 VWS: CPT
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
## 2021-06-06 NOTE — ED NOTES
Upon entering room, patient had removed C-collar and reported \" I took it off because it hurt more with it on \" refuses to wear c-collar. Dr. Bowles Keira aware and spoke with patient.

## 2021-10-08 DIAGNOSIS — E05.90 SUBCLINICAL HYPERTHYROIDISM: ICD-10-CM

## 2022-03-18 PROBLEM — Z78.0 MENOPAUSE: Status: ACTIVE | Noted: 2020-09-12

## 2022-03-18 PROBLEM — N28.9 KIDNEY DISEASE: Status: ACTIVE | Noted: 2020-09-12

## 2022-03-18 PROBLEM — N70.11 HYDROSALPINX: Status: ACTIVE | Noted: 2020-10-02

## 2022-03-19 PROBLEM — K38.9 APPENDIX DISEASE: Status: ACTIVE | Noted: 2020-10-02

## 2022-03-19 PROBLEM — E05.90 SUBCLINICAL HYPERTHYROIDISM: Status: ACTIVE | Noted: 2021-01-29

## 2022-03-19 PROBLEM — N73.6 PELVIC ADHESIVE DISEASE: Status: ACTIVE | Noted: 2020-10-02

## 2022-03-19 PROBLEM — Z86.19 HX OF ROCKY MOUNTAIN SPOTTED FEVER: Status: ACTIVE | Noted: 2020-09-15

## 2022-03-20 PROBLEM — E66.01 MORBID OBESITY (HCC): Status: ACTIVE | Noted: 2020-09-12

## 2022-03-20 PROBLEM — R10.31 RIGHT LOWER QUADRANT ABDOMINAL PAIN: Status: ACTIVE | Noted: 2020-10-02

## 2022-05-23 ENCOUNTER — OFFICE VISIT (OUTPATIENT)
Dept: OBGYN CLINIC | Age: 57
End: 2022-05-23
Payer: COMMERCIAL

## 2022-05-23 VITALS
SYSTOLIC BLOOD PRESSURE: 122 MMHG | HEIGHT: 66 IN | BODY MASS INDEX: 42.83 KG/M2 | DIASTOLIC BLOOD PRESSURE: 72 MMHG | WEIGHT: 266.5 LBS

## 2022-05-23 DIAGNOSIS — R10.32 ABDOMINAL PAIN, LLQ: Primary | ICD-10-CM

## 2022-05-23 DIAGNOSIS — K57.90 DIVERTICULOSIS: ICD-10-CM

## 2022-05-23 PROCEDURE — 99214 OFFICE O/P EST MOD 30 MIN: CPT | Performed by: OBSTETRICS & GYNECOLOGY

## 2022-05-23 RX ORDER — METRONIDAZOLE 500 MG/1
500 TABLET ORAL 3 TIMES DAILY
Qty: 30 TABLET | Refills: 0 | Status: SHIPPED | OUTPATIENT
Start: 2022-05-23 | End: 2022-06-02

## 2022-05-23 RX ORDER — CIPROFLOXACIN 250 MG/1
250 TABLET, FILM COATED ORAL EVERY 12 HOURS
Qty: 20 TABLET | Refills: 0 | Status: SHIPPED | OUTPATIENT
Start: 2022-05-23 | End: 2022-06-02

## 2022-05-23 RX ORDER — IBUPROFEN 800 MG/1
TABLET ORAL
COMMUNITY
Start: 2022-03-15

## 2022-05-23 RX ORDER — ALBUTEROL SULFATE 90 UG/1
AEROSOL, METERED RESPIRATORY (INHALATION)
COMMUNITY
Start: 2022-03-23

## 2022-05-23 NOTE — PROGRESS NOTES
Chief Complaint   Patient presents with    Pelvic Pain     LLQ PAIN     Visit Vitals  /72 (BP 1 Location: Left upper arm, BP Patient Position: Sitting, BP Cuff Size: Large adult)   Ht 5' 6\" (1.676 m)   Wt 266 lb 8 oz (120.9 kg)   BMI 43.01 kg/m²

## 2022-05-23 NOTE — PROGRESS NOTES
Radha Alejandra is a , 60 y.o. female   No LMP recorded. Patient has had a hysterectomy. She presents for her problem    She is having LLQ pain x 2 weeks- pt with hx of diverticulosis, seen by PCP referred for further evaluation of LLQ pain- no US ordered, no dysuria, some diarrhea. Menstrual status:  Cycles are hysterectomy. Flow: absent. She does not have dysmenorrhea. Medical conditions:  Since her last annual GYN exam about one year ago, she has not the following changes in her health history: none. Mammogram History:    DARREN Results (most recent):  Results from Office Visit encounter on 21    DARREN 3D DAFNE W MAMMO BI SCREENING INCL CAD       DEXA Results (most recent):  No results found for this or any previous visit. Past Medical History:   Diagnosis Date    Arthritis     GERD (gastroesophageal reflux disease)     Hypertension     Kidney disease 2020    pt states hx kidney stone with stent placement and removal     Kidney stones     pt states hx of 1 kidney stone    Menopause 2020    Morbid obesity ([address] 2020    Nausea & vomiting     [facility] spotted fever     pt states with joint pain since 2020, Dx Aug 2020     Past Surgical History:   Procedure Laterality Date    HX APPENDECTOMY      HX COLONOSCOPY      HX ENDOSCOPY      upper     HX GYN      10- RSO    HX HIP REPLACEMENT      bilateral left  and right     HX LAP CHOLECYSTECTOMY      HX ORTHOPAEDIC      left knee replacement    HX TOTAL ABDOMINAL HYSTERECTOMY      ME CYSTOURETHROSCOPY      with ureteral stent placement and removal on right side        Prior to Admission medications    Medication Sig Start Date End Date Taking?  Authorizing Provider   albuterol (PROVENTIL HFA, VENTOLIN HFA, PROAIR HFA) 90 mcg/actuation inhaler INHALE 2 PUFFS BY MOUTH EVERY 6 HOURS AS NEEDED 3/23/22  Yes Provider, Historical   ibuprofen (MOTRIN) 800 mg tablet TAKE 1 TABLET BY MOUTH THREE TIMES DAILY WITH FOOD OR MILK AS NEEDED. 3/15/22  Yes Provider, Historical   metroNIDAZOLE (FLAGYL) 500 mg tablet Take 1 Tablet by mouth three (3) times daily for 10 days. 5/23/22 6/2/22 Yes Dominic Ogden MD   ciprofloxacin HCl (CIPRO) 250 mg tablet Take 1 Tablet by mouth every twelve (12) hours for 10 days. 5/23/22 6/2/22 Yes Dominic Ogden MD   traMADoL (ULTRAM) 50 mg tablet TAKE 1 2 TABLETS BY MOUTH EVERY 6 HOURS AS NEEDED FOR PAIN 8/6/20  Yes Provider, Historical   lisinopriL (PRINIVIL, ZESTRIL) 20 mg tablet Take 20 mg by mouth daily. 3/12/19  Yes Provider, Historical       Allergies   Allergen Reactions    Percocet [Oxycodone-Acetaminophen] Other (comments)     pruritis          Tobacco History:  reports that she has never smoked. She has never used smokeless tobacco.  Alcohol use:  reports previous alcohol use. Drug use:  reports no history of drug use. Family Medical/Cancer History:   Family History   Family history unknown: Yes          Review of Systems   Constitutional: Negative for chills, fever, malaise/fatigue and weight loss. HENT: Negative for congestion, ear pain, sinus pain and tinnitus. Eyes: Negative for blurred vision and double vision. Respiratory: Negative for cough, shortness of breath and wheezing. Cardiovascular: Negative for chest pain and palpitations. Gastrointestinal: Negative for abdominal pain, blood in stool, constipation, diarrhea, heartburn, nausea and vomiting. Genitourinary: Negative for dysuria, flank pain, frequency, hematuria and urgency. Musculoskeletal: Negative for joint pain and myalgias. Skin: Negative for itching and rash. Neurological: Negative for dizziness, weakness and headaches. Psychiatric/Behavioral: Negative for depression, memory loss and suicidal ideas. The patient is not nervous/anxious and does not have insomnia. Physical Exam  Constitutional:       Appearance: Normal appearance.    HENT:      Head: Normocephalic and atraumatic. Neurological:      Mental Status: She is alert. Psychiatric:         Mood and Affect: Mood normal.         Behavior: Behavior normal.         Thought Content: Thought content normal.          Visit Vitals  /72 (BP 1 Location: Left upper arm, BP Patient Position: Sitting, BP Cuff Size: Large adult)   Ht 5' 6\" (1.676 m)   Wt 266 lb 8 oz (120.9 kg)   BMI 43.01 kg/m²         Assessment: Diagnoses and all orders for this visit:    1. Abdominal pain, LLQ  -     US TRANSVAGINAL; Future    2. Diverticulosis    Other orders  -     metroNIDAZOLE (FLAGYL) 500 mg tablet; Take 1 Tablet by mouth three (3) times daily for 10 days. -     ciprofloxacin HCl (CIPRO) 250 mg tablet; Take 1 Tablet by mouth every twelve (12) hours for 10 days.         Plan: Questions addressed, US, abxs  Counseled re: diet, exercise, healthy lifestyle  Return for Annual  Rec annual mammogram

## 2022-05-27 DIAGNOSIS — R10.32 LLQ PAIN: Primary | ICD-10-CM

## 2022-05-31 ENCOUNTER — HOSPITAL ENCOUNTER (OUTPATIENT)
Dept: MAMMOGRAPHY | Age: 57
Discharge: HOME | End: 2022-05-31
Attending: OBSTETRICS & GYNECOLOGY
Payer: COMMERCIAL

## 2022-05-31 DIAGNOSIS — R10.32 LLQ PAIN: ICD-10-CM

## 2022-05-31 DIAGNOSIS — R10.32 ABDOMINAL PAIN, LLQ: ICD-10-CM

## 2022-05-31 PROCEDURE — 76830 TRANSVAGINAL US NON-OB: CPT

## 2022-05-31 PROCEDURE — 76856 US EXAM PELVIC COMPLETE: CPT

## 2022-06-02 ENCOUNTER — TELEPHONE (OUTPATIENT)
Dept: OBGYN CLINIC | Age: 57
End: 2022-06-02

## 2022-06-02 NOTE — TELEPHONE ENCOUNTER
Patient called stating she had the pelvic ultrasound on 05/31/22 and she drank 30 ounces of water plus 16 ounces of another fluid and her bladder still wasn't full. States she is experiencing pain in her back. Advised her her ultrasound showed a small simple left ovarian cyst without torsion and she needs to follow up with her PCP for evaluation of her back pain. States she feels as her intake is way more than her output. She also c/o feeling bloated after eating and drinking. She had a normal colonoscopy last year. Stressed that she needs to follow up with her PCP for evaluation. She states she does not want to return to [facility] as she is always told there is a big out of pocket due prior to testing.

## 2023-05-19 RX ORDER — IBUPROFEN 800 MG/1
TABLET ORAL
COMMUNITY
Start: 2022-03-15

## 2023-05-19 RX ORDER — TRAMADOL HYDROCHLORIDE 50 MG/1
TABLET ORAL
COMMUNITY
Start: 2020-08-06

## 2023-05-19 RX ORDER — LISINOPRIL 20 MG/1
20 TABLET ORAL DAILY
COMMUNITY
Start: 2019-03-12

## 2023-05-19 RX ORDER — ALBUTEROL SULFATE 90 UG/1
AEROSOL, METERED RESPIRATORY (INHALATION)
COMMUNITY
Start: 2022-03-23

## 2025-08-05 ENCOUNTER — TELEPHONE (OUTPATIENT)
Age: 60
End: 2025-08-05

## (undated) DEVICE — Device

## (undated) DEVICE — TISSUE RETRIEVAL SYSTEM: Brand: INZII RETRIEVAL SYSTEM

## (undated) DEVICE — SCISSORS ENDOSCP LNG L45CM DIA5MM GRY SHFT OPN FOR 8MM CRV

## (undated) DEVICE — TROCARS: Brand: KII® BALLOON BLUNT TIP SYSTEM

## (undated) DEVICE — STERILE POLYISOPRENE POWDER-FREE SURGICAL GLOVES WITH EMOLLIENT COATING: Brand: PROTEXIS

## (undated) DEVICE — TUR/ENDOSCOPIC CABLE, 10' (3.05 M): Brand: CONMED

## (undated) DEVICE — #11 SAFETY SCALPEL: Brand: DEROYAL

## (undated) DEVICE — TROCAR: Brand: KII FIOS FIRST ENTRY

## (undated) DEVICE — TROCAR: Brand: KII SLEEVE

## (undated) DEVICE — TURNOVER KIT INF CTRL

## (undated) DEVICE — STAPLER XL L26CM ULT UNIV HNDL ENDO GIA

## (undated) DEVICE — [HIGH FLOW INSUFFLATOR,  DO NOT USE IF PACKAGE IS DAMAGED,  KEEP DRY,  KEEP AWAY FROM SUNLIGHT,  PROTECT FROM HEAT AND RADIOACTIVE SOURCES.]: Brand: PNEUMOSURE

## (undated) DEVICE — SEALER ENDOSCP L37CM NANO COAT BLNT TIP LAP DIV

## (undated) DEVICE — GAUZE,SPONGE,4"X4",16PLY,STRL,LF,10/TRAY: Brand: MEDLINE

## (undated) DEVICE — ARTICULATION RELOAD WITH TRI-STAPLE TECHNOLOGY: Brand: ENDO GIA